# Patient Record
Sex: MALE | Race: WHITE
[De-identification: names, ages, dates, MRNs, and addresses within clinical notes are randomized per-mention and may not be internally consistent; named-entity substitution may affect disease eponyms.]

---

## 2017-05-17 ENCOUNTER — HOSPITAL ENCOUNTER (OUTPATIENT)
Dept: HOSPITAL 83 - SDC | Age: 72
Discharge: HOME | End: 2017-05-17
Attending: INTERNAL MEDICINE
Payer: COMMERCIAL

## 2017-05-17 VITALS — SYSTOLIC BLOOD PRESSURE: 116 MMHG | DIASTOLIC BLOOD PRESSURE: 62 MMHG

## 2017-05-17 VITALS — DIASTOLIC BLOOD PRESSURE: 71 MMHG | SYSTOLIC BLOOD PRESSURE: 145 MMHG

## 2017-05-17 VITALS — SYSTOLIC BLOOD PRESSURE: 142 MMHG | DIASTOLIC BLOOD PRESSURE: 77 MMHG

## 2017-05-17 VITALS — SYSTOLIC BLOOD PRESSURE: 93 MMHG | DIASTOLIC BLOOD PRESSURE: 45 MMHG

## 2017-05-17 VITALS — WEIGHT: 230 LBS | HEIGHT: 68.98 IN | BODY MASS INDEX: 34.07 KG/M2

## 2017-05-17 DIAGNOSIS — K62.1: ICD-10-CM

## 2017-05-17 DIAGNOSIS — K57.30: ICD-10-CM

## 2017-05-17 DIAGNOSIS — Z82.49: ICD-10-CM

## 2017-05-17 DIAGNOSIS — I10: ICD-10-CM

## 2017-05-17 DIAGNOSIS — Z12.11: Primary | ICD-10-CM

## 2017-05-17 DIAGNOSIS — E78.00: ICD-10-CM

## 2017-05-17 DIAGNOSIS — Z98.890: ICD-10-CM

## 2017-05-17 DIAGNOSIS — K63.5: ICD-10-CM

## 2020-05-12 ENCOUNTER — HOSPITAL ENCOUNTER (OUTPATIENT)
Dept: HOSPITAL 83 - LAB | Age: 75
Discharge: HOME | End: 2020-05-12
Attending: INTERNAL MEDICINE
Payer: COMMERCIAL

## 2020-05-12 DIAGNOSIS — I25.10: ICD-10-CM

## 2020-05-12 DIAGNOSIS — R59.9: ICD-10-CM

## 2020-05-12 DIAGNOSIS — I71.2: Primary | ICD-10-CM

## 2020-05-12 LAB — CREAT SERPL-MCNC: 1.12 MG/DL (ref 0.7–1.3)

## 2020-06-03 ENCOUNTER — HOSPITAL ENCOUNTER (OUTPATIENT)
Dept: HOSPITAL 83 - CARD | Age: 75
Discharge: HOME | End: 2020-06-03
Attending: INTERNAL MEDICINE
Payer: COMMERCIAL

## 2020-06-03 DIAGNOSIS — I25.10: Primary | ICD-10-CM

## 2020-06-03 DIAGNOSIS — R73.03: ICD-10-CM

## 2020-06-03 NOTE — NUR
INFORMED CONSENT OBTAINED FOR EXERCISE CARDIOLITE STRESS TEST WITH DR. JEONG.
RESTING EKG RBBB WITH A SUPINE HR OF 63 WITH BP /76 AND HR OF 73 WITH BP
/74 IN STANDING POSITION. PT COMPLETED 6:00 OF A MIGUEL PROTOCOL WITH
COMPLETION OF STAGE II AT 2.5 MPH AND 12% GRADE. REACHED A PEAK HR 
WHICH IS 92% OF PREDICTED MAX WITH A PEAK BP /50. TEST TERMINATED
BECAUSE OF FATIGUE AND SOB. HAD NO CHEST PAIN OR ANY EKG CHANGES. DID HAVE
PVC'S WITH COUPLETS WITH INCREASE IN STAGE II. HAS AN AVERAGE EXERCISE
TOLERANCE. LAST RECOVERY HR OF 97 WITH BP /62. AWAITING SCANNING IN
STABLE CONDITION.

## 2023-01-29 ENCOUNTER — HOSPITAL ENCOUNTER (EMERGENCY)
Dept: HOSPITAL 83 - ED | Age: 78
Discharge: HOME | End: 2023-01-29
Payer: COMMERCIAL

## 2023-01-29 VITALS — BODY MASS INDEX: 31.84 KG/M2 | HEIGHT: 68.98 IN | WEIGHT: 215 LBS

## 2023-01-29 DIAGNOSIS — Z79.899: ICD-10-CM

## 2023-01-29 DIAGNOSIS — Z88.8: ICD-10-CM

## 2023-01-29 DIAGNOSIS — H81.10: Primary | ICD-10-CM

## 2023-01-29 DIAGNOSIS — Z98.890: ICD-10-CM

## 2023-01-29 LAB
ALP SERPL-CCNC: 59 U/L (ref 46–116)
ALT SERPL W P-5'-P-CCNC: 24 U/L (ref 10–49)
BASOPHILS # BLD AUTO: 0 10*3/UL (ref 0–0.1)
BASOPHILS NFR BLD AUTO: 0.1 % (ref 0–1)
BUN SERPL-MCNC: 12 MG/DL (ref 9–23)
CHLORIDE SERPL-SCNC: 103 MMOL/L (ref 98–107)
EOSINOPHIL # BLD AUTO: 0 10*3/UL (ref 0–0.4)
EOSINOPHIL # BLD AUTO: 0.3 % (ref 1–4)
ERYTHROCYTE [DISTWIDTH] IN BLOOD BY AUTOMATED COUNT: 13.4 % (ref 0–14.5)
HCT VFR BLD AUTO: 42.1 % (ref 42–52)
LYMPHOCYTES # BLD AUTO: 1.2 10*3/UL (ref 1.3–4.4)
LYMPHOCYTES NFR BLD AUTO: 15.5 % (ref 27–41)
MCH RBC QN AUTO: 29.6 PG (ref 27–31)
MCHC RBC AUTO-ENTMCNC: 34.2 G/DL (ref 33–37)
MCV RBC AUTO: 86.6 FL (ref 80–94)
MONOCYTES # BLD AUTO: 0.3 10*3/UL (ref 0.1–1)
MONOCYTES NFR BLD MANUAL: 3.5 % (ref 3–9)
NEUT #: 6 10*3/UL (ref 2.3–7.9)
NEUT %: 80.2 % (ref 47–73)
NRBC BLD QL AUTO: 0 % (ref 0–0)
PLATELET # BLD AUTO: 159 10*3/UL (ref 130–400)
PMV BLD AUTO: 10.4 FL (ref 9.6–12.3)
POTASSIUM SERPL-SCNC: 4 MMOL/L (ref 3.4–5.1)
PROT SERPL-MCNC: 6.8 GM/DL (ref 6–8)
RBC # BLD AUTO: 4.86 10*6/UL (ref 4.5–5.9)
TSH SERPL DL<=0.005 MIU/L-ACNC: 0.67 UIU/ML (ref 0.55–4.78)
WBC NRBC COR # BLD AUTO: 7.5 10*3/UL (ref 4.8–10.8)

## 2023-01-31 ENCOUNTER — HOSPITAL ENCOUNTER (OUTPATIENT)
Dept: HOSPITAL 83 - US | Age: 78
Discharge: HOME | End: 2023-01-31
Attending: INTERNAL MEDICINE
Payer: COMMERCIAL

## 2023-01-31 DIAGNOSIS — I10: ICD-10-CM

## 2023-01-31 DIAGNOSIS — I65.23: Primary | ICD-10-CM

## 2023-01-31 DIAGNOSIS — R42: ICD-10-CM

## 2024-09-24 ENCOUNTER — HOSPITAL ENCOUNTER (OUTPATIENT)
Dept: HOSPITAL 83 - CARD | Age: 79
Discharge: HOME | End: 2024-09-24
Attending: INTERNAL MEDICINE
Payer: MEDICARE

## 2024-09-24 DIAGNOSIS — I11.9: ICD-10-CM

## 2024-09-24 DIAGNOSIS — I08.0: Primary | ICD-10-CM
